# Patient Record
Sex: FEMALE | Race: WHITE | Employment: OTHER | URBAN - METROPOLITAN AREA
[De-identification: names, ages, dates, MRNs, and addresses within clinical notes are randomized per-mention and may not be internally consistent; named-entity substitution may affect disease eponyms.]

---

## 2021-11-29 ENCOUNTER — APPOINTMENT (OUTPATIENT)
Dept: CT IMAGING | Facility: CLINIC | Age: 76
End: 2021-11-29
Attending: EMERGENCY MEDICINE
Payer: COMMERCIAL

## 2021-11-29 ENCOUNTER — HOSPITAL ENCOUNTER (EMERGENCY)
Facility: CLINIC | Age: 76
Discharge: HOME | End: 2021-11-29
Attending: EMERGENCY MEDICINE | Admitting: EMERGENCY MEDICINE
Payer: COMMERCIAL

## 2021-11-29 VITALS
RESPIRATION RATE: 10 BRPM | OXYGEN SATURATION: 94 % | DIASTOLIC BLOOD PRESSURE: 94 MMHG | WEIGHT: 180 LBS | TEMPERATURE: 97.9 F | HEART RATE: 89 BPM | SYSTOLIC BLOOD PRESSURE: 162 MMHG

## 2021-11-29 DIAGNOSIS — K63.89 EPIPLOIC APPENDAGITIS: ICD-10-CM

## 2021-11-29 DIAGNOSIS — R10.32 ABDOMINAL PAIN, LEFT LOWER QUADRANT: ICD-10-CM

## 2021-11-29 DIAGNOSIS — I16.0 HYPERTENSIVE URGENCY: ICD-10-CM

## 2021-11-29 LAB
ALBUMIN SERPL-MCNC: 3.5 G/DL (ref 3.4–5)
ALBUMIN UR-MCNC: NEGATIVE MG/DL
ALP SERPL-CCNC: 61 U/L (ref 40–150)
ALT SERPL W P-5'-P-CCNC: 31 U/L (ref 0–50)
ANION GAP SERPL CALCULATED.3IONS-SCNC: 5 MMOL/L (ref 3–14)
APPEARANCE UR: CLEAR
AST SERPL W P-5'-P-CCNC: 23 U/L (ref 0–45)
ATRIAL RATE - MUSE: 90 BPM
BASOPHILS # BLD AUTO: 0.1 10E3/UL (ref 0–0.2)
BASOPHILS NFR BLD AUTO: 1 %
BILIRUB SERPL-MCNC: 0.5 MG/DL (ref 0.2–1.3)
BILIRUB UR QL STRIP: NEGATIVE
BUN SERPL-MCNC: 23 MG/DL (ref 7–30)
CALCIUM SERPL-MCNC: 8.7 MG/DL (ref 8.5–10.1)
CHLORIDE BLD-SCNC: 110 MMOL/L (ref 94–109)
CO2 SERPL-SCNC: 28 MMOL/L (ref 20–32)
COLOR UR AUTO: ABNORMAL
CREAT SERPL-MCNC: 0.77 MG/DL (ref 0.52–1.04)
DIASTOLIC BLOOD PRESSURE - MUSE: NORMAL MMHG
EOSINOPHIL # BLD AUTO: 0.3 10E3/UL (ref 0–0.7)
EOSINOPHIL NFR BLD AUTO: 3 %
ERYTHROCYTE [DISTWIDTH] IN BLOOD BY AUTOMATED COUNT: 12.7 % (ref 10–15)
GFR SERPL CREATININE-BSD FRML MDRD: 76 ML/MIN/1.73M2
GLUCOSE BLD-MCNC: 118 MG/DL (ref 70–99)
GLUCOSE UR STRIP-MCNC: NEGATIVE MG/DL
HCT VFR BLD AUTO: 45.5 % (ref 35–47)
HGB BLD-MCNC: 14.9 G/DL (ref 11.7–15.7)
HGB UR QL STRIP: ABNORMAL
HOLD SPECIMEN: NORMAL
IMM GRANULOCYTES # BLD: 0 10E3/UL
IMM GRANULOCYTES NFR BLD: 0 %
INTERPRETATION ECG - MUSE: NORMAL
KETONES UR STRIP-MCNC: NEGATIVE MG/DL
LACTATE SERPL-SCNC: 1.2 MMOL/L (ref 0.7–2)
LACTATE SERPL-SCNC: 3 MMOL/L (ref 0.7–2)
LEUKOCYTE ESTERASE UR QL STRIP: ABNORMAL
LIPASE SERPL-CCNC: 172 U/L (ref 73–393)
LYMPHOCYTES # BLD AUTO: 2.5 10E3/UL (ref 0.8–5.3)
LYMPHOCYTES NFR BLD AUTO: 27 %
MCH RBC QN AUTO: 30.3 PG (ref 26.5–33)
MCHC RBC AUTO-ENTMCNC: 32.7 G/DL (ref 31.5–36.5)
MCV RBC AUTO: 93 FL (ref 78–100)
MONOCYTES # BLD AUTO: 0.8 10E3/UL (ref 0–1.3)
MONOCYTES NFR BLD AUTO: 9 %
MUCOUS THREADS #/AREA URNS LPF: PRESENT /LPF
NEUTROPHILS # BLD AUTO: 5.5 10E3/UL (ref 1.6–8.3)
NEUTROPHILS NFR BLD AUTO: 60 %
NITRATE UR QL: NEGATIVE
NRBC # BLD AUTO: 0 10E3/UL
NRBC BLD AUTO-RTO: 0 /100
P AXIS - MUSE: 72 DEGREES
PH UR STRIP: 5.5 [PH] (ref 5–7)
PLATELET # BLD AUTO: 308 10E3/UL (ref 150–450)
POTASSIUM BLD-SCNC: 3.6 MMOL/L (ref 3.4–5.3)
PR INTERVAL - MUSE: 168 MS
PROT SERPL-MCNC: 7.4 G/DL (ref 6.8–8.8)
QRS DURATION - MUSE: 86 MS
QT - MUSE: 392 MS
QTC - MUSE: 479 MS
R AXIS - MUSE: -22 DEGREES
RBC # BLD AUTO: 4.92 10E6/UL (ref 3.8–5.2)
RBC URINE: <1 /HPF
SODIUM SERPL-SCNC: 143 MMOL/L (ref 133–144)
SP GR UR STRIP: 1.02 (ref 1–1.03)
SYSTOLIC BLOOD PRESSURE - MUSE: NORMAL MMHG
T AXIS - MUSE: 68 DEGREES
TROPONIN I SERPL HS-MCNC: 9 NG/L
TROPONIN I SERPL-MCNC: <0.015 UG/L (ref 0–0.04)
UROBILINOGEN UR STRIP-MCNC: NORMAL MG/DL
VENTRICULAR RATE- MUSE: 90 BPM
WBC # BLD AUTO: 9.2 10E3/UL (ref 4–11)
WBC URINE: 2 /HPF

## 2021-11-29 PROCEDURE — 96361 HYDRATE IV INFUSION ADD-ON: CPT

## 2021-11-29 PROCEDURE — 250N000011 HC RX IP 250 OP 636: Performed by: EMERGENCY MEDICINE

## 2021-11-29 PROCEDURE — 250N000009 HC RX 250: Performed by: EMERGENCY MEDICINE

## 2021-11-29 PROCEDURE — 81001 URINALYSIS AUTO W/SCOPE: CPT | Performed by: EMERGENCY MEDICINE

## 2021-11-29 PROCEDURE — 36415 COLL VENOUS BLD VENIPUNCTURE: CPT | Performed by: EMERGENCY MEDICINE

## 2021-11-29 PROCEDURE — 96360 HYDRATION IV INFUSION INIT: CPT | Mod: 59

## 2021-11-29 PROCEDURE — 85025 COMPLETE CBC W/AUTO DIFF WBC: CPT | Performed by: EMERGENCY MEDICINE

## 2021-11-29 PROCEDURE — 83605 ASSAY OF LACTIC ACID: CPT | Mod: 91 | Performed by: EMERGENCY MEDICINE

## 2021-11-29 PROCEDURE — 258N000003 HC RX IP 258 OP 636: Performed by: EMERGENCY MEDICINE

## 2021-11-29 PROCEDURE — 84484 ASSAY OF TROPONIN QUANT: CPT | Performed by: EMERGENCY MEDICINE

## 2021-11-29 PROCEDURE — 99285 EMERGENCY DEPT VISIT HI MDM: CPT | Mod: 25

## 2021-11-29 PROCEDURE — 93005 ELECTROCARDIOGRAM TRACING: CPT

## 2021-11-29 PROCEDURE — 83690 ASSAY OF LIPASE: CPT | Performed by: EMERGENCY MEDICINE

## 2021-11-29 PROCEDURE — 82040 ASSAY OF SERUM ALBUMIN: CPT | Performed by: EMERGENCY MEDICINE

## 2021-11-29 PROCEDURE — 74177 CT ABD & PELVIS W/CONTRAST: CPT

## 2021-11-29 RX ORDER — IOPAMIDOL 755 MG/ML
91 INJECTION, SOLUTION INTRAVASCULAR ONCE
Status: COMPLETED | OUTPATIENT
Start: 2021-11-29 | End: 2021-11-29

## 2021-11-29 RX ADMIN — SODIUM CHLORIDE 66 ML: 900 INJECTION INTRAVENOUS at 06:08

## 2021-11-29 RX ADMIN — IOPAMIDOL 91 ML: 755 INJECTION, SOLUTION INTRAVENOUS at 06:08

## 2021-11-29 RX ADMIN — SODIUM CHLORIDE 500 ML: 9 INJECTION, SOLUTION INTRAVENOUS at 06:21

## 2021-11-29 RX ADMIN — SODIUM CHLORIDE 500 ML: 9 INJECTION, SOLUTION INTRAVENOUS at 05:49

## 2021-11-29 ASSESSMENT — ENCOUNTER SYMPTOMS
VOMITING: 0
BLOOD IN STOOL: 0
CONSTIPATION: 0
NAUSEA: 0
DYSURIA: 0
ABDOMINAL PAIN: 1
DIARRHEA: 0
ABDOMINAL DISTENTION: 1

## 2021-11-29 NOTE — ED PROVIDER NOTES
History   Chief Complaint:  Abdominal Pain     HPI  Skye Guevara is a 76 year old female with history of hypertension who presents with 2 episodes of sudden onset low abdominal pain that woke her up this morning and have since resolved. States she stayed in bed through both episodes. Also endorses abdominal bloating. She reports that a few weeks ago she had similar pain and an associated syncopal episode. When she fainted she hit her head in the fall and she went to [employer] (see imaging results below). States she felt fine yesterday. She denies fever, nausea, vomiting, diarrhea, constipation, blood in the stool, and pain with urination. Endorses occasional urinary incontinence at night. Patient takes her blood pressure medication, Lisinopril, in the morning and did not take it yet this morning.     CT Abdomen and Pelvis (10/25/2021):  IMPRESSION:   1. Long segment of apparent mild colonic wall thickening in the descending colon, suggesting colitis (although the nondistended state of the bowel in this area could artificially accentuate this appearance). Infectious etiologies would be considered most likely.  2. No other acute findings in the abdomen or pelvis.    Review of Systems   Gastrointestinal: Positive for abdominal distention and abdominal pain. Negative for blood in stool, constipation, diarrhea, nausea and vomiting.   Genitourinary: Negative for dysuria.     Allergies:  Ciprofloxacin    Medications:  Zestril  Protonix  Claritin  Synthroid  Flonase  Prozac    Past Medical History:     Vasovagal syncope  Acute colitis  Osteoarthritis, right hip  Adjustment disorder  Sleep apnea  GERD  Migraine with aura  Benign neoplasm of colon  Polycythemia vera  Hypertension  Hyperlipidemia  Hypothyroidism  Varicella  Colon polyp  Hyperglycemia  DJD  Anxiety    Past Surgical History:    Tubal ligation  Tonsillectomy  Vaginal prolapse repair  Colon polyp removal  Hysterectomy  Incontinence surgery    Family History:     Mother: cataract, high cholesterol  Brother: prostate cancer  Sister: thyroid disorder  Sister: thyroid disorder  Sister: uterine cancer    Social History:  The patient presents to the ED via EMS.   The patient lives alone in a condo. She walks with a walker.     Physical Exam     Patient Vitals for the past 24 hrs:   BP Temp Temp src Pulse Resp SpO2 Weight   11/29/21 0630 (!) 162/94 -- -- 89 10 94 % --   11/29/21 0603 -- -- -- -- -- -- 81.6 kg (180 lb)   11/29/21 0600 (!) 178/104 -- -- 81 16 97 % --   11/29/21 0530 (!) 177/105 -- -- 85 -- 96 % --   11/29/21 0513 (!) 204/115 97.9  F (36.6  C) Oral 92 18 98 % --       Physical Exam  VS: Reviewed per above  HENT: normal speech  EYES: sclera anicteric  CV: Rate as noted, regular rhythm.   RESP: Effort normal. Breath sounds are normal bilaterally.  GI: no tenderness/rebound/guarding, not distended.  NEURO: Alert, moving all extremities  MSK: No deformity of the extremities  SKIN: Warm and dry      Emergency Department Course   ECG  ECG obtained at 0514, ECG read at 0524  Normal sinus rhythm. Possible left atrial enlargement. Borderline ECG.    Rate 90 bpm. UT interval 168 ms. QRS duration 86 ms. QT/QTc 392/479 ms. P-R-T axes 72 -22 68.     Imaging:  CT Abdomen Pelvis w Contrast   Final Result   IMPRESSION:    1.  Small ovoid focus of fat density and inflammatory change adjacent to the sigmoid colon suggestive of epiploic appendagitis. No adjacent colonic wall thickening.      2.  Diffuse atherosclerotic change and coronary artery calcification.        Report per radiology    Laboratory:  Labs Ordered and Resulted from Time of ED Arrival to Time of ED Departure   LACTIC ACID WHOLE BLOOD - Abnormal       Result Value    Lactic Acid 3.0 (*)    COMPREHENSIVE METABOLIC PANEL - Abnormal    Sodium 143      Potassium 3.6      Chloride 110 (*)     Carbon Dioxide (CO2) 28      Anion Gap 5      Urea Nitrogen 23      Creatinine 0.77      Calcium 8.7      Glucose 118 (*)      Alkaline Phosphatase 61      AST 23      ALT 31      Protein Total 7.4      Albumin 3.5      Bilirubin Total 0.5      GFR Estimate 76     ROUTINE UA WITH MICROSCOPIC REFLEX TO CULTURE - Abnormal    Color Urine Light Yellow      Appearance Urine Clear      Glucose Urine Negative      Bilirubin Urine Negative      Ketones Urine Negative      Specific Gravity Urine 1.017      Blood Urine Trace (*)     pH Urine 5.5      Protein Albumin Urine Negative      Urobilinogen Urine Normal      Nitrite Urine Negative      Leukocyte Esterase Urine Trace (*)     Mucus Urine Present (*)     RBC Urine <1      WBC Urine 2     LIPASE - Normal    Lipase 172     TROPONIN I - Normal    Troponin I High Sensitivity 9     TROPONIN I - Normal    Troponin I <0.015     CBC WITH PLATELETS AND DIFFERENTIAL    WBC Count 9.2      RBC Count 4.92      Hemoglobin 14.9      Hematocrit 45.5      MCV 93      MCH 30.3      MCHC 32.7      RDW 12.7      Platelet Count 308      % Neutrophils 60      % Lymphocytes 27      % Monocytes 9      % Eosinophils 3      % Basophils 1      % Immature Granulocytes 0      NRBCs per 100 WBC 0      Absolute Neutrophils 5.5      Absolute Lymphocytes 2.5      Absolute Monocytes 0.8      Absolute Eosinophils 0.3      Absolute Basophils 0.1      Absolute Immature Granulocytes 0.0      Absolute NRBCs 0.0     LACTIC ACID WHOLE BLOOD      Procedures    Emergency Department Course:  Reviewed:  I reviewed nursing notes, vitals, past medical history, Care Everywhere and [facility]    Assessments:  0515 I obtained history and examined the patient as noted above.   0650 I rechecked the patient and explained findings.     Consults:    Interventions:  0549 0.9% sodium chloride BOLUS 500mL IV   0621 0.9% sodium chloride BOLUS 500mL IV    Disposition:  Care of the patient was transferred to my colleague Dr. Monroy pending repeat lactate.     Impression & Plan     CMS Diagnoses: The Lactic acid level is elevated due to dehydration, at this  time there is no sign of severe sepsis or septic shock. and None    Medical Decision Making:  Patient presents to the ER for evaluation of intermittent episodes of severe left lower abdominal pain few hours prior to arrival.  Currently she is asymptomatic.  On arrival vital signs are reassuring.  Abdominal exam is unremarkable without peritoneal signs.  Patient has had 2 recent episodes of what sounds like vasovagal syncope in the setting of severe abdominal pain.  She was admitted to [facility] a few weeks ago for this.  At that time she had evidence of colitis with negative stool studies.  There was lower concern for ischemic colitis.  Today labs are reassuring aside from mild lactic acidosis of 3.  There is no leukocytosis or fever or obvious underlying infectious process to suggest sepsis.  CT of the abdomen shows evidence of epiploic appendagitis but no other acute pathology.  Patient did have elevated blood pressure upon arrival but has no signs of endorgan damage.  I suspect this represents hypertensive urgency.  Encouraged close primary care follow-up for ongoing monitoring of blood pressure control.  At signout to my colleague, plan for discharge pending repeat lactic acid after IV fluids.  She was also given information for other outpatient gastroenterology clinics, as she has been having trouble getting any urgent follow-up in the [employer] system.  Return precautions discussed prior to my shift.    Diagnosis:    ICD-10-CM    1. Abdominal pain, left lower quadrant  R10.32    2. Epiploic appendagitis  K63.89        Discharge Medications:  New Prescriptions    No medications on file       Scribe Disclosure:  I, Naila Gonzalez, am serving as a scribe at 5:13 AM on 11/29/2021 to document services personally performed by Chema Lopez MD based on my observations and the provider's statements to me.      Chema Lopez MD  11/29/21 0716

## 2021-11-29 NOTE — ED TRIAGE NOTES
Pt BIBA for sudden abdominal pain that started throughout night. Ab pain was in lower abdomen bilaterally. Denies pain right now. Pt had similar abdominal pain 2 weeks ago but also had a syncopal episode then. Buddhism changed heart meds then. Pt presents hypertensive, able to scoot self from EMS gurney to ER bed. OVSS. ABCs intact.

## 2021-11-29 NOTE — DISCHARGE INSTRUCTIONS
Your CT scan showed something called epiploic appendagitis.  This thought to be caused by twisting of a piece of fat along the colon.  Epiploic appendagitis is a benign and self-limiting condition. Complete resolution without surgical intervention usually occurs between 3 to 14 days    Discharge Instructions  Abdominal Pain    Abdominal pain (belly pain) can be caused by many things. Your evaluation today does not show the exact cause for your pain. Your provider today has decided that it is unlikely your pain is due to a life threatening problem, or a problem requiring surgery or hospital admission. Sometimes those problems cannot be found right away, so it is very important that you follow up as directed.  Sometimes only the changes which occur over time allow the cause of your pain to be found.    Generally, every Emergency Department visit should have a follow-up clinic visit with either a primary or a specialty clinic/provider. Please follow-up as instructed by your emergency provider today. With abdominal pain, we often recommend very close follow-up, such as the following day.    ADULTS:  Return to the Emergency Department right away if:    You get an oral temperature above 102oF or as directed by your provider.  You have blood in your stools. This may be bright red or appear as black, tarry stools.    You keep vomiting (throwing up) or cannot drink liquids.  You see blood when you vomit.   You cannot have a bowel movement or you cannot pass gas.  Your stomach gets bloated or bigger.  Your skin or the whites of your eyes look yellow.  You faint.  You have bloody, frequent or painful urination (peeing).  You have new symptoms or anything that worries you.    CHILDREN:  Return to the Emergency Department right away if your child has any of the above-listed symptoms or the following:    Pushes your hand away or screams/cries when his/her belly is touched.  You notice your child is very fussy or weak.  Your child  is very tired and is too tired to eat or drink.  Your child is dehydrated.  Signs of dehydration can be:  Significant change in the amount of wet diapers/urine.  Your infant or child starts to have dry mouth and lips, or no saliva (spit) or tears.    PREGNANT WOMEN:  Return to the Emergency Department right away if you have any of the above-listed symptoms or the following:    You have bleeding, leaking fluid or passing tissue from the vagina.  You have worse pain or cramping, or pain in your shoulder or back.  You have vomiting that will not stop.  You have a temperature of 100oF or more.  Your baby is not moving as much as usual.  You faint.  You get a bad headache with or without eye problems and abdominal pain.  You have a seizure.  You have unusual discharge from your vagina and abdominal pain.    Abdominal pain is pretty common during pregnancy.  Your pain may or may not be related to your pregnancy. You should follow-up closely with your OB provider so they can evaluate you and your baby.  Until you follow-up with your regular provider, do the following:     Avoid sex and do not put anything in your vagina.  Drink clear fluids.  Only take medications approved by your provider.    MORE INFORMATION:    Appendicitis:  A possible cause of abdominal pain in any person who still has their appendix is acute appendicitis. Appendicitis is often hard to diagnose.  Testing does not always rule out early appendicitis or other causes of abdominal pain. Close follow-up with your provider and re-evaluations may be needed to figure out the reason for your abdominal pain.    Follow-up:  It is very important that you make an appointment with your clinic and go to the appointment.  If you do not follow-up with your primary provider, it may result in missing an important development which could result in permanent injury or disability and/or lasting pain.  If there is any problem keeping your appointment, call your provider or  "return to the Emergency Department.    Medications:  Take your medications as directed by your provider today.  Before using over-the-counter medications, ask your provider and make sure to take the medications as directed.  If you have any questions about medications, ask your provider.    Diet:  Resume your normal diet as much as possible, but do not eat fried, fatty or spicy foods while you have pain.  Do not drink alcohol or have caffeine.  Do not smoke tobacco.    Probiotics: If you have been given an antibiotic, you may want to also take a probiotic pill or eat yogurt with live cultures. Probiotics have \"good bacteria\" to help your intestines stay healthy. Studies have shown that probiotics help prevent diarrhea (loose stools) and other intestine problems (including C. diff infection) when you take antibiotics. You can buy these without a prescription in the pharmacy section of the store.     If you were given a prescription for medicine here today, be sure to read all of the information (including the package insert) that comes with your prescription.  This will include important information about the medicine, its side effects, and any warnings that you need to know about.  The pharmacist who fills the prescription can provide more information and answer questions you may have about the medicine.  If you have questions or concerns that the pharmacist cannot address, please call or return to the Emergency Department.       Remember that you can always come back to the Emergency Department if you are not able to see your regular provider in the amount of time listed above, if you get any new symptoms, or if there is anything that worries you.    Discharge Instructions  Hypertension - High Blood Pressure    During you visit to the Emergency Department, your blood pressure was higher than the recommended blood pressure.  This may be related to stress, pain, medication or other temporary conditions. In these " cases, your blood pressure may return to normal on its own. If you have a history of high blood pressure, you may need to have your provider adjust your medications. Sometimes, your high measurement here may indicate that you have developed high blood pressure that will stay high unless it is treated. As a general rule, high blood pressure causes problems over years rather than days, weeks, or months. So, while it is important to treat blood pressure, it is rarely important to treat blood pressure immediately. Occasionally we will begin a medication in the Emergency Department; more often we will recommend close follow-up for medications with a primary doctor/clinic.    Generally, every Emergency Department visit should have a follow-up clinic visit with either a primary or a specialty clinic/provider. Please follow-up as instructed by your emergency provider today.    Return to the Emergency Department if you start to have:  A severe headache.  Chest pain.  Shortness of breath.  Weakness or numbness that affects one part of the body.  Confusion.  Vision changes.  Significant swelling of legs and/or eyes.  A reaction to any medication started in the Emergency Department.    What can I do to help myself?  Avoid alcohol.  Take any blood pressure medicine that you are prescribed.  Get a good night s sleep.  Lower your salt intake.  Exercise.  Lose weight.  Manage stress.  See your doctor regularly    If blood pressure medication was started in the Emergency Department:  The medicine may not have an immediate effect. The body and brain determine what blood pressure you have. The medicine s job is to retrain the body s  thermostat  to a lower blood pressure.  You will need to follow up with your provider to see how this medicine is working for you.  If you were given a prescription for medicine here today, be sure to read all of the information (including the package insert) that comes with your prescription.  This will  include important information about the medicine, its side effects, and any warnings that you need to know about.  The pharmacist who fills the prescription can provide more information and answer questions you may have about the medicine.  If you have questions or concerns that the pharmacist cannot address, please call or return to the Emergency Department.   Remember that you can always come back to the Emergency Department if you are not able to see your regular provider in the amount of time listed above, if you get any new symptoms, or if there is anything that worries you.

## 2021-11-29 NOTE — ED NOTES
Assumed care from Dr. Lopez pending repeat lactic acid after fluid resuscitation.  Value returned at 1.2 (WNL).  I rechecked the patient at 7:58 AM, she was feeling improved and is comfortable with discharged home.  She will closely follow-up with her primary care provider as well as gastroenterology.     TriggerFroilan MD  11/29/21 [id number]

## 2021-11-29 NOTE — ED NOTES
Bed: ED04  Expected date: 11/29/21  Expected time: 5:05 AM  Means of arrival: Ambulance  Comments:  Stephani M3 76F Abd pain/HTN